# Patient Record
Sex: MALE | Race: WHITE | NOT HISPANIC OR LATINO | URBAN - METROPOLITAN AREA
[De-identification: names, ages, dates, MRNs, and addresses within clinical notes are randomized per-mention and may not be internally consistent; named-entity substitution may affect disease eponyms.]

---

## 2023-12-13 ENCOUNTER — APPOINTMENT (RX ONLY)
Dept: URBAN - METROPOLITAN AREA CLINIC 161 | Facility: CLINIC | Age: 43
Setting detail: DERMATOLOGY
End: 2023-12-13

## 2023-12-13 DIAGNOSIS — D49.2 NEOPLASM OF UNSPECIFIED BEHAVIOR OF BONE, SOFT TISSUE, AND SKIN: ICD-10-CM

## 2023-12-13 PROBLEM — D48.5 NEOPLASM OF UNCERTAIN BEHAVIOR OF SKIN: Status: ACTIVE | Noted: 2023-12-13

## 2023-12-13 PROCEDURE — ? CONSULTATION FOR EXCISION

## 2023-12-13 PROCEDURE — 99202 OFFICE O/P NEW SF 15 MIN: CPT

## 2023-12-13 PROCEDURE — ? COUNSELING

## 2023-12-13 ASSESSMENT — LOCATION SIMPLE DESCRIPTION DERM: LOCATION SIMPLE: POSTERIOR SCALP

## 2023-12-13 ASSESSMENT — LOCATION ZONE DERM: LOCATION ZONE: SCALP

## 2023-12-13 ASSESSMENT — LOCATION DETAILED DESCRIPTION DERM: LOCATION DETAILED: RIGHT SUPERIOR OCCIPITAL SCALP

## 2023-12-13 NOTE — PROCEDURE: COUNSELING
Patient Specific Counseling (Will Not Stick From Patient To Patient): =============12/13/23 \\n- 2.0x 2.5cm\\n-left parietal scalp\\n-skin colored nodule; mobile\\n-favor that this is Pilar cyst, vs. other neoplasm \\n-Discussed options of leaving alone vs excision. R/B/SE of exc reviewed\\n-Pt elected to think about excision at this time and will let us know his decision
Detail Level: Detailed
Patient Specific Counseling (Will Not Stick From Patient To Patient): =============12/13/23 \\n-Discussed leaving alone (benign) vs shave removal bx vs excision\\n-R/B/SE of each reviewed\\n-Discussed avoiding when brushing or cutting hair, to avoid further trauma\\n-Pt elected to leave alone at this time. Pt wants to remove when scheduling pilar cyst excision
Depth Of Biopsy: dermis
Was A Bandage Applied: Yes
Size Of Lesion In Cm: 0
Biopsy Type: H and E
Biopsy Method: Personna blade
Anesthesia Type: 1% lidocaine with epinephrine
Anesthesia Volume In Cc: 0.5
Hemostasis: Drysol
Wound Care: Petrolatum
Dressing: bandage
Destruction After The Procedure: No
Type Of Destruction Used: Curettage
Curettage Text: The wound bed was treated with curettage after the biopsy was performed.
Cryotherapy Text: The wound bed was treated with cryotherapy after the biopsy was performed.
Electrodesiccation Text: The wound bed was treated with electrodesiccation after the biopsy was performed.
Electrodesiccation And Curettage Text: The wound bed was treated with electrodesiccation and curettage after the biopsy was performed.
Silver Nitrate Text: The wound bed was treated with silver nitrate after the biopsy was performed.
Lab: [id number]
Consent: Written consent was obtained and risks were reviewed including but not limited to scarring, infection, bleeding, scabbing, incomplete removal, nerve damage and allergy to anesthesia.
Post-Care Instructions: I reviewed with the patient in detail post-care instructions. Patient is to keep the biopsy site dry overnight, and then apply bacitracin twice daily until healed. Patient may apply hydrogen peroxide soaks to remove any crusting.
Notification Instructions: Patient will be notified of biopsy results. However, patient instructed to call the office if not contacted within 2 weeks.
Billing Type: Third-Party Bill
Information: Selecting Yes will display possible errors in your note based on the variables you have selected. This validation is only offered as a suggestion for you. PLEASE NOTE THAT THE VALIDATION TEXT WILL BE REMOVED WHEN YOU FINALIZE YOUR NOTE. IF YOU WANT TO FAX A PRELIMINARY NOTE YOU WILL NEED TO TOGGLE THIS TO 'NO' IF YOU DO NOT WANT IT IN YOUR FAXED NOTE.
Patient Specific Counseling (Will Not Stick From Patient To Patient): =============12/13/23 \\n-even pink to skin colored papule; favor a dermal nevus\\n-Discussed leaving alone and observation vs shave removal bx vs excision\\n-R/B/SE of each reviewed\\n-Discussed avoiding when brushing or cutting hair, to avoid further trauma\\n-Pt elected to leave alone at this time. Pt wants to remove when scheduling excision

## 2023-12-13 NOTE — PROCEDURE: CONSULTATION FOR EXCISION OF BENIGN LESIONS
Detail Level: Detailed
Size Of Lesion: 2
X Size Of Lesion In Cm (Optional): 2.5
Other Plan: He would like to think about excision

## 2023-12-13 NOTE — HPI: EVALUATION OF SKIN LESION(S)
Hpi Title: Evaluation of Skin Lesions
Additional History: \\n-Pt has possible cyst on left crown of scalp x years. Pt states growing and tender at times\\n-Pt has mole on right occipital scalp which is constantly irritated or cut by brushing or haircuts